# Patient Record
Sex: FEMALE | Race: OTHER | HISPANIC OR LATINO
[De-identification: names, ages, dates, MRNs, and addresses within clinical notes are randomized per-mention and may not be internally consistent; named-entity substitution may affect disease eponyms.]

---

## 2021-05-26 ENCOUNTER — TRANSCRIPTION ENCOUNTER (OUTPATIENT)
Age: 61
End: 2021-05-26

## 2023-04-20 PROBLEM — Z00.00 ENCOUNTER FOR PREVENTIVE HEALTH EXAMINATION: Status: ACTIVE | Noted: 2023-04-20

## 2023-04-21 ENCOUNTER — RESULT REVIEW (OUTPATIENT)
Age: 63
End: 2023-04-21

## 2023-04-21 ENCOUNTER — APPOINTMENT (OUTPATIENT)
Dept: RHEUMATOLOGY | Facility: CLINIC | Age: 63
End: 2023-04-21
Payer: COMMERCIAL

## 2023-04-21 ENCOUNTER — NON-APPOINTMENT (OUTPATIENT)
Age: 63
End: 2023-04-21

## 2023-04-21 VITALS
SYSTOLIC BLOOD PRESSURE: 118 MMHG | OXYGEN SATURATION: 99 % | WEIGHT: 158 LBS | DIASTOLIC BLOOD PRESSURE: 70 MMHG | HEART RATE: 81 BPM

## 2023-04-21 VITALS — HEIGHT: 61 IN | BODY MASS INDEX: 29.85 KG/M2

## 2023-04-21 DIAGNOSIS — M79.643 PAIN IN UNSPECIFIED HAND: ICD-10-CM

## 2023-04-21 DIAGNOSIS — M25.50 PAIN IN UNSPECIFIED JOINT: ICD-10-CM

## 2023-04-21 DIAGNOSIS — Z84.0 FAMILY HISTORY OF DISEASES OF THE SKIN AND SUBCUTANEOUS TISSUE: ICD-10-CM

## 2023-04-21 DIAGNOSIS — M25.511 PAIN IN RIGHT SHOULDER: ICD-10-CM

## 2023-04-21 DIAGNOSIS — M19.041 PRIMARY OSTEOARTHRITIS, RIGHT HAND: ICD-10-CM

## 2023-04-21 DIAGNOSIS — M19.042 PRIMARY OSTEOARTHRITIS, RIGHT HAND: ICD-10-CM

## 2023-04-21 LAB
BASOPHILS # BLD AUTO: 0.05 K/UL
BASOPHILS NFR BLD AUTO: 1 %
EOSINOPHIL # BLD AUTO: 0.09 K/UL
EOSINOPHIL NFR BLD AUTO: 1.9 %
HCT VFR BLD CALC: 36.4 %
HGB BLD-MCNC: 11.6 G/DL
IMM GRANULOCYTES NFR BLD AUTO: 0.2 %
LYMPHOCYTES # BLD AUTO: 1.27 K/UL
LYMPHOCYTES NFR BLD AUTO: 26.2 %
MAN DIFF?: NORMAL
MCHC RBC-ENTMCNC: 30.9 PG
MCHC RBC-ENTMCNC: 31.9 GM/DL
MCV RBC AUTO: 97.1 FL
MONOCYTES # BLD AUTO: 0.36 K/UL
MONOCYTES NFR BLD AUTO: 7.4 %
NEUTROPHILS # BLD AUTO: 3.06 K/UL
NEUTROPHILS NFR BLD AUTO: 63.3 %
PLATELET # BLD AUTO: 215 K/UL
RBC # BLD: 3.75 M/UL
RBC # FLD: 15.4 %
WBC # FLD AUTO: 4.84 K/UL

## 2023-04-21 PROCEDURE — 99203 OFFICE O/P NEW LOW 30 MIN: CPT | Mod: 25

## 2023-04-21 PROCEDURE — 36415 COLL VENOUS BLD VENIPUNCTURE: CPT

## 2023-04-22 LAB
ALBUMIN SERPL ELPH-MCNC: 4.5 G/DL
ALP BLD-CCNC: 102 U/L
ALT SERPL-CCNC: 21 U/L
ANION GAP SERPL CALC-SCNC: 16 MMOL/L
AST SERPL-CCNC: 29 U/L
BILIRUB SERPL-MCNC: 1.1 MG/DL
BUN SERPL-MCNC: 18 MG/DL
CALCIUM SERPL-MCNC: 9.8 MG/DL
CHLORIDE SERPL-SCNC: 103 MMOL/L
CO2 SERPL-SCNC: 22 MMOL/L
CREAT SERPL-MCNC: 0.54 MG/DL
EGFR: 104 ML/MIN/1.73M2
ERYTHROCYTE [SEDIMENTATION RATE] IN BLOOD BY WESTERGREN METHOD: 20 MM/HR
GLUCOSE SERPL-MCNC: 113 MG/DL
POTASSIUM SERPL-SCNC: 3.5 MMOL/L
PROT SERPL-MCNC: 7.1 G/DL
RHEUMATOID FACT SER QL: <10 IU/ML
SODIUM SERPL-SCNC: 141 MMOL/L

## 2023-04-23 LAB
CRP SERPL-MCNC: <3 MG/L
RNA POLYMERASE III IGG: 4 UNITS

## 2023-04-24 LAB
CCP AB SER IA-ACNC: <8 UNITS
CHROMATIN AB SERPL-ACNC: <0.2 AL
DSDNA AB SER-ACNC: <12 IU/ML
ENA JO1 AB SER IA-ACNC: <0.2 AL
ENA RNP AB SER IA-ACNC: 0.2 AL
ENA SM AB SER IA-ACNC: <0.2 AL
ENA SS-A AB SER IA-ACNC: <0.2 AL
ENA SS-B AB SER IA-ACNC: <0.2 AL
RF+CCP IGG SER-IMP: NEGATIVE

## 2023-04-26 LAB — ANA SER IF-ACNC: NEGATIVE

## 2023-04-29 NOTE — HISTORY OF PRESENT ILLNESS
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 She lost her job bc of the finger pain.\par PMD did blood work, and she was told it was normal.\par \par She banged the side of her right arm and since then she has pain there, but it is getting better with time.\par \par Pain is worse in the cold weather.\par \par She has not taken OTC NSAIDs.\par Her PMD prescribed Naproxen, but she rarely takes it.\par \par Pain is not all day.  She doesn’t wake up with pain.\par No joint swelling.\par No joint stiffness.\par \par No rashes.\par No personal or FH of psoriasis.\par No oral ulcer\par No dry eyes or mouth\par No Raynauds\par \par no FH of arthritis.\par Her daughter was told she had lupus.  She developed difficulty breathing, and received a lung transplant.  She was doing well, and then was found to have an abdominal mass.  She was told there was no cure, and she went on hospice and passed away at age 42.

## 2023-04-29 NOTE — ASSESSMENT
[FreeTextEntry1] : pain in DIP joints and some PIP joints is from OA.\par However, there is a question of mild fullness at MCP2's\par Given FH (daughter  of ?lupus) will send autoimmune workup and inflammatory markers (including Vectra)

## 2023-06-30 ENCOUNTER — APPOINTMENT (OUTPATIENT)
Dept: RHEUMATOLOGY | Facility: CLINIC | Age: 63
End: 2023-06-30

## 2023-06-30 VITALS
OXYGEN SATURATION: 99 % | HEIGHT: 61 IN | BODY MASS INDEX: 30.21 KG/M2 | SYSTOLIC BLOOD PRESSURE: 116 MMHG | WEIGHT: 160 LBS | HEART RATE: 71 BPM | DIASTOLIC BLOOD PRESSURE: 80 MMHG